# Patient Record
Sex: FEMALE | Race: WHITE | NOT HISPANIC OR LATINO | URBAN - METROPOLITAN AREA
[De-identification: names, ages, dates, MRNs, and addresses within clinical notes are randomized per-mention and may not be internally consistent; named-entity substitution may affect disease eponyms.]

---

## 2024-07-03 DIAGNOSIS — I45.81 LONG QT SYNDROME: ICD-10-CM

## 2024-07-09 RX ORDER — PROPRANOLOL HYDROCHLORIDE 120 MG/1
120 CAPSULE, EXTENDED RELEASE ORAL DAILY
COMMUNITY
Start: 2024-04-17 | End: 2024-07-09 | Stop reason: SDUPTHER

## 2024-07-09 NOTE — TELEPHONE ENCOUNTER
Per patient's request Propranolol Rx refill electronically submitted to [facility] pharmacy on record.

## 2024-07-10 RX ORDER — PROPRANOLOL HYDROCHLORIDE 120 MG/1
120 CAPSULE, EXTENDED RELEASE ORAL DAILY
Qty: 90 CAPSULE | Refills: 0 | Status: SHIPPED | OUTPATIENT
Start: 2024-07-10 | End: 2024-10-08

## 2024-09-25 PROBLEM — I45.81 LONG QT SYNDROME: Status: ACTIVE | Noted: 2024-09-25

## 2024-09-26 ENCOUNTER — OFFICE VISIT (OUTPATIENT)
Dept: CARDIOLOGY | Facility: CLINIC | Age: 34
End: 2024-09-26
Payer: COMMERCIAL

## 2024-09-26 VITALS
BODY MASS INDEX: 21.16 KG/M2 | SYSTOLIC BLOOD PRESSURE: 127 MMHG | WEIGHT: 127 LBS | DIASTOLIC BLOOD PRESSURE: 80 MMHG | HEART RATE: 52 BPM | OXYGEN SATURATION: 99 % | HEIGHT: 65 IN

## 2024-09-26 DIAGNOSIS — I45.81 LONG QT SYNDROME: ICD-10-CM

## 2024-09-26 DIAGNOSIS — Z82.49 FAMILY HISTORY OF HYPERTROPHIC CARDIOMYOPATHY: Primary | ICD-10-CM

## 2024-09-26 PROCEDURE — 3008F BODY MASS INDEX DOCD: CPT | Performed by: INTERNAL MEDICINE

## 2024-09-26 PROCEDURE — 99214 OFFICE O/P EST MOD 30 MIN: CPT | Performed by: INTERNAL MEDICINE

## 2024-09-26 PROCEDURE — 93005 ELECTROCARDIOGRAM TRACING: CPT | Performed by: INTERNAL MEDICINE

## 2024-09-26 PROCEDURE — 1036F TOBACCO NON-USER: CPT | Performed by: INTERNAL MEDICINE

## 2024-09-26 RX ORDER — LEVONORGESTREL 19.5 MG/1
1 INTRAUTERINE DEVICE INTRAUTERINE
COMMUNITY

## 2024-09-26 RX ORDER — BISMUTH SUBSALICYLATE 262 MG
1 TABLET,CHEWABLE ORAL DAILY
COMMUNITY

## 2024-09-26 RX ORDER — SPIRONOLACTONE 50 MG/1
100 TABLET, FILM COATED ORAL
COMMUNITY
Start: 2024-08-12

## 2024-09-26 RX ORDER — CLINDAMYCIN PHOSPHATE 10 UG/ML
LOTION TOPICAL DAILY PRN
COMMUNITY
Start: 2024-08-12

## 2024-09-26 RX ORDER — PROPRANOLOL HYDROCHLORIDE 120 MG/1
120 CAPSULE, EXTENDED RELEASE ORAL DAILY
Qty: 90 CAPSULE | Refills: 3 | Status: SHIPPED | OUTPATIENT
Start: 2024-09-26 | End: 2025-09-26

## 2024-09-26 RX ORDER — FLUTICASONE PROPIONATE 50 MCG
2 SPRAY, SUSPENSION (ML) NASAL
COMMUNITY

## 2024-09-26 ASSESSMENT — ENCOUNTER SYMPTOMS
LOSS OF SENSATION IN FEET: 0
OCCASIONAL FEELINGS OF UNSTEADINESS: 0
DEPRESSION: 0

## 2024-09-26 ASSESSMENT — PATIENT HEALTH QUESTIONNAIRE - PHQ9
1. LITTLE INTEREST OR PLEASURE IN DOING THINGS: NOT AT ALL
SUM OF ALL RESPONSES TO PHQ9 QUESTIONS 1 AND 2: 0
2. FEELING DOWN, DEPRESSED OR HOPELESS: NOT AT ALL

## 2024-09-26 ASSESSMENT — COLUMBIA-SUICIDE SEVERITY RATING SCALE - C-SSRS
2. HAVE YOU ACTUALLY HAD ANY THOUGHTS OF KILLING YOURSELF?: NO
1. IN THE PAST MONTH, HAVE YOU WISHED YOU WERE DEAD OR WISHED YOU COULD GO TO SLEEP AND NOT WAKE UP?: NO
6. HAVE YOU EVER DONE ANYTHING, STARTED TO DO ANYTHING, OR PREPARED TO DO ANYTHING TO END YOUR LIFE?: NO

## 2024-09-26 ASSESSMENT — PAIN SCALES - GENERAL: PAINLEVEL: 0-NO PAIN

## 2024-09-26 NOTE — PATIENT INSTRUCTIONS
It was nice to see you today!    You were seen for long QT syndrome.    Long QT syndrome, often abbreviated LQTS, is a disease that affects the electrical activity of the heart. It affects about 1 in 2000 people.  The ion channels in the heart that are responsible for electrical recovery after each heart beat are not functioning normally. This means that the time it takes for the heart to recover from each beat is longer than normal.  Long QT syndrome does run in families, which means it is considered an inherited disorder. The abnormal ion channel function can be caused by changes in a gene responsible for the channel  known as genetic mutations. Having a long QT can result in abnormal heart beats, near-syncope, syncope and even death.     To minimize your risk you should     1. Continue your propranolol.  2. Make sure that any new medication over-the-counter or one prescribed for you  does not cause prolongation of the QT interval.    The website www.crediblemeds.org  is a great site that lets you check the medication to see if it is safe.  3. Avoid stimulants like energy drinks. Coffee and Tea are fine.  4. Report any episodes of loss of consciousness or close calls to your Doctor immediately.    Please schedule the echocardiogram for when you return for your next visit  In 10-12 months.     Congratulations on your wedding!!     Please call if you would like to be seen for prenatal counseling for your long QT.

## 2024-09-26 NOTE — PROGRESS NOTES
"Chief Complaint:   Follow-up (Patient returns to clinic voicing no cardiac complaints. She denies palpitations, lightheadedness, syncope, dyspnea, orthopnea and chest pain/discomfort.  IZA Kong RN)     History Of Present Illness:    Madonna Montano is a 34 y.o. female presenting with follow up for long Qt1.     Patient is a 34-year-old female with history of long QT 1.  She follows up periodically with Dr. Montelongo and overall she has had a good year.  No limitations she recently had a trail race and ran 11 kilometers without any issue.     Her mom was diagnosed with hypertrophic cardiomyopathy and was being tested. It is in our legacy chart and its negative for a gene related to her HCM.  Her last echocardiogram was here in [place] in 2018 and had some mild MR/TR but otherwise normal.  She remains on propranolol without any issue.    Last Recorded Vitals:  Vitals:    09/26/24 1429   BP: 127/80   BP Location: Left arm   Patient Position: Sitting   BP Cuff Size: Adult   Pulse: 52   SpO2: 99%   Weight: 57.6 kg (127 lb)   Height: 1.651 m (5' 5\")       Past Medical History:  history of long QT1, previously had an ICD, but underwent extraction due to fracture. Since her indication was not syncope and she had never been on beta-blockers, she was initiated on propranolol. She has done well with no syncope or near syncope despite regular vigorous exercise.     Past Surgical History:  ICD implant  2006 for Long QT and family's wish to avoid betablockers and maintain sports participation.  ICD explant and lead extraction in 2012 for RV lead fracture.      Social History:  She reports that she has never smoked. She has never used smokeless tobacco. No history on file for alcohol use and drug use.    Family History:  Mother with HCM  Father has  LQT1 gene      Allergies:  Patient has no known allergies.    Outpatient Medications:  Current Outpatient Medications   Medication Instructions    clindamycin (Cleocin T) 1 % lotion " Topical, Daily PRN    fluticasone (Flonase) 50 mcg/actuation nasal spray 2 sprays, nasal, Daily RT    levonorgestreL (Kyleena) 17.5 mcg/24 hr (5 yrs) 19.5 mg intrauterine device 1 each, intrauterine    propranolol LA (INDERAL LA) 120 mg, oral, Daily    spironolactone (ALDACTONE) 100 mg, oral, Daily RT       Physical Exam:    GEN: NAD  HEENT: ATNC,  anicteric, no JVD  CV: RRR, no r/g/m  Pulm: CTAB  Abdomen: NTND  Ext: warm, no LE edema noted  Neuro: A+Ox3  Psych: appropriate       Last Labs:  None recent  Cardiology Tests:    ECG:  SB 53 bpm Qtc 467 msec     Assessment/Plan     #Family hx of HCM (mom, negative genetic test)  #long QT1    Continue propranolol at current dose; refill  Will need q5 year screening echocardiogram (will get with next visit)  In future will discuss pre-gerda counseling     Follow up in 1 year with echocardiogram      Reed Montesinos MD    Seen with Dr Montesinos-  she will schedule follow up and echo for HCM screening.  No LVOT murmur on examination.

## 2024-09-27 LAB
ATRIAL RATE: 53 BPM
P AXIS: 7 DEGREES
P OFFSET: 205 MS
P ONSET: 160 MS
PR INTERVAL: 124 MS
Q ONSET: 222 MS
QRS COUNT: 9 BEATS
QRS DURATION: 80 MS
QT INTERVAL: 498 MS
QTC CALCULATION(BAZETT): 467 MS
QTC FREDERICIA: 478 MS
R AXIS: 77 DEGREES
T AXIS: 67 DEGREES
T OFFSET: 471 MS
VENTRICULAR RATE: 53 BPM
